# Patient Record
Sex: FEMALE | Race: OTHER | NOT HISPANIC OR LATINO
[De-identification: names, ages, dates, MRNs, and addresses within clinical notes are randomized per-mention and may not be internally consistent; named-entity substitution may affect disease eponyms.]

---

## 2021-09-24 ENCOUNTER — TRANSCRIPTION ENCOUNTER (OUTPATIENT)
Age: 21
End: 2021-09-24

## 2022-02-12 ENCOUNTER — TRANSCRIPTION ENCOUNTER (OUTPATIENT)
Age: 22
End: 2022-02-12

## 2023-06-06 PROBLEM — Z00.00 ENCOUNTER FOR PREVENTIVE HEALTH EXAMINATION: Status: ACTIVE | Noted: 2023-06-06

## 2023-06-07 ENCOUNTER — APPOINTMENT (OUTPATIENT)
Dept: NEUROLOGY | Facility: CLINIC | Age: 23
End: 2023-06-07
Payer: MEDICAID

## 2023-06-07 ENCOUNTER — TRANSCRIPTION ENCOUNTER (OUTPATIENT)
Age: 23
End: 2023-06-07

## 2023-06-07 VITALS — HEART RATE: 66 BPM | DIASTOLIC BLOOD PRESSURE: 99 MMHG | SYSTOLIC BLOOD PRESSURE: 117 MMHG

## 2023-06-07 DIAGNOSIS — Z86.59 PERSONAL HISTORY OF OTHER MENTAL AND BEHAVIORAL DISORDERS: ICD-10-CM

## 2023-06-07 DIAGNOSIS — Z78.9 OTHER SPECIFIED HEALTH STATUS: ICD-10-CM

## 2023-06-07 DIAGNOSIS — Z82.49 FAMILY HISTORY OF ISCHEMIC HEART DISEASE AND OTHER DISEASES OF THE CIRCULATORY SYSTEM: ICD-10-CM

## 2023-06-07 DIAGNOSIS — Z84.89 FAMILY HISTORY OF OTHER SPECIFIED CONDITIONS: ICD-10-CM

## 2023-06-07 DIAGNOSIS — Z83.3 FAMILY HISTORY OF DIABETES MELLITUS: ICD-10-CM

## 2023-06-07 DIAGNOSIS — R51.9 HEADACHE, UNSPECIFIED: ICD-10-CM

## 2023-06-07 PROCEDURE — 99204 OFFICE O/P NEW MOD 45 MIN: CPT

## 2023-06-07 RX ORDER — NORETHINDRONE ACETATE AND ETHINYL ESTRADIOL AND FERROUS FUMARATE 1.5-30(21)
KIT ORAL
Refills: 0 | Status: ACTIVE | COMMUNITY

## 2023-06-07 NOTE — ASSESSMENT
[FreeTextEntry1] : Impression is that of no localizing neurological symptoms. Because of the combination of sharp head pains and fuzzy comprehension, I ordered MRI of the brain to exclude any intracranial pathology. We will see patient back in follow up when imaging is complete.\par \par Total clinician time spent today on the patient is 30 minutes including preparing to see the patient, obtaining and/or reviewing and confirming history, performing medically necessary and appropriate examination, counseling and educating the patient and/or family, documenting clinical information in the EHR and communicating and/or referring to other healthcare professionals.\par \par Entered by Cecy Shea acting as scribe for Dr. Hernandez.\par \par \par The documentation recorded by the scribe, in my presence, accurately reflects the service I personally performed, and the decisions made by me with my edits as appropriate. \par Prince Hernandez MD, FAAN, FACP\par Diplomate American Board of Psychiatry & Neurology\par \par

## 2023-06-07 NOTE — HISTORY OF PRESENT ILLNESS
[FreeTextEntry1] : Ms. Holcomb is a 22-year-old woman who presents today in neurologic consultation accompanied by her mother for symptoms that began about 1 and 1/2 months ago of static electric shocks in various locations in her head and body. Patient also notes fuzziness of cognitive comprehension with difficulty understanding someone's name or number on the phone. This lasted about a week and then disappeared. 1 year ago, patient had occipital headaches which would last a week and then disappear. She currently has no symptoms for about 3 weeks.

## 2023-06-21 ENCOUNTER — APPOINTMENT (OUTPATIENT)
Dept: MRI IMAGING | Facility: CLINIC | Age: 23
End: 2023-06-21
Payer: MEDICAID

## 2023-06-21 PROCEDURE — 70551 MRI BRAIN STEM W/O DYE: CPT

## 2023-06-27 ENCOUNTER — APPOINTMENT (OUTPATIENT)
Dept: NEUROLOGY | Facility: CLINIC | Age: 23
End: 2023-06-27
Payer: MEDICAID

## 2023-06-27 VITALS — DIASTOLIC BLOOD PRESSURE: 83 MMHG | HEART RATE: 58 BPM | SYSTOLIC BLOOD PRESSURE: 110 MMHG

## 2023-06-27 PROCEDURE — 99214 OFFICE O/P EST MOD 30 MIN: CPT

## 2023-06-27 NOTE — ASSESSMENT
[FreeTextEntry1] : 22 year old female with intermittent head pain now resolved. Today we reviewed her MRI of the brain, negative for acute pathology reflective of her symptoms. She will follow up PRN. \par \par I have personally reviewed with the PA, this patient’s history and physical exam findings, as documented above. I have discussed the relevant areas of concern, having direct implications to the presenting problems and illnesses, and have personally examined all pertinent findings which impact on the prior neurological treatment. \par Demetria Pérez MS, PA-C\par Prince Hernandez MD \par  \par \par \par

## 2023-06-27 NOTE — PHYSICAL EXAM
[General Appearance - Alert] : alert [General Appearance - In No Acute Distress] : in no acute distress [Impaired Insight] : insight and judgment were intact [Oriented To Time, Place, And Person] : oriented to person, place, and time [Affect] : the affect was normal [Person] : oriented to person [Place] : oriented to place [Time] : oriented to time [Concentration Intact] : normal concentrating ability [Visual Intact] : visual attention was ~T not ~L decreased [Naming Objects] : no difficulty naming common objects [Repeating Phrases] : no difficulty repeating a phrase [Writing A Sentence] : no difficulty writing a sentence [Fluency] : fluency intact [Comprehension] : comprehension intact [Reading] : reading intact [Past History] : adequate knowledge of personal past history [Cranial Nerves Optic (II)] : visual acuity intact bilaterally,  visual fields full to confrontation, pupils equal round and reactive to light [Cranial Nerves Oculomotor (III)] : extraocular motion intact [Cranial Nerves Trigeminal (V)] : facial sensation intact symmetrically [Cranial Nerves Facial (VII)] : face symmetrical [Cranial Nerves Vestibulocochlear (VIII)] : hearing was intact bilaterally [Cranial Nerves Glossopharyngeal (IX)] : tongue and palate midline [Cranial Nerves Accessory (XI - Cranial And Spinal)] : head turning and shoulder shrug symmetric [Cranial Nerves Hypoglossal (XII)] : there was no tongue deviation with protrusion [Motor Strength] : muscle strength was normal in all four extremities [No Muscle Atrophy] : normal bulk in all four extremities [Sensation Tactile Decrease] : light touch was intact [Balance] : balance was intact [2+] : Ankle jerk left 2+ [PERRL With Normal Accommodation] : pupils were equal in size, round, reactive to light, with normal accommodation [Extraocular Movements] : extraocular movements were intact [Hearing Threshold Finger Rub Not Wilkinson] : hearing was normal [No Spinal Tenderness] : no spinal tenderness [Abnormal Walk] : normal gait [Involuntary Movements] : no involuntary movements were seen [Motor Tone] : muscle strength and tone were normal [FreeTextEntry1] : PHYSICAL EXAMINATION:\par Head: Normocephalic, atraumatic. Negative TA tenderness/prominence. \par \par Neck: Supple with full range of motion; nontender with negative bilateral Spurling's signs. \par \par Spine: Full range of motion; nontender. Negative straight leg raise maneuvers. \par \par Extremities: Non-tender. Atraumatic. Negative Tinel's signs. \par \par NEUROLOGICAL EXAMINATION: \par \par Mental Status: Patient is a good informant with intact orientation, attention, concentration, recent and remote memory. Language evaluation reveals no evidence of aphasia. Fund of knowledge is normal. \par \par Cranial Nerves Cranial Nerves: \par \par II, III, IV, VI: Pupils are equal, round, and reactive to light and accommodation. No evidence of afferent pupillary defect. Visual fields are full to confrontation. Eye movements are full without evidence of nystagmus or internuclear ophthalmoplegia. Funduscopic examination reveals sharp disc margins. \par \par V: Normal jaw movements. Normal facial sensation. \par \par VII: Normal facial motor testing. \par \par VIII: Grossly normal hearing bilaterally. \par \par IX, X: Palate moves symmetrically. No dysarthria. \par \par XI: Normal shoulder shrug and sternocleidomastoid power. \par \par XII: Tongue appears normal and protrudes in the midline. \par \par Motor: Normal bulk, tone, and power throughout. \par \par Muscle Stretch Reflexes (right/left): 2+ symmetrical. \par \par Plantar Responses: Flexor bilaterally. \par \par Coordination: Normal finger to nose and heel to shin testing, no truncal ataxia and no tremor. \par \par Sensation: Normal primary sensation. Normal double simultaneous stimulation. \par \par Gait and Station: Normal base, stride, and turning. Normal toe and heel walking. Normal tandem. Negative Romberg.\par  [Motor Strength Upper Extremities Bilaterally] : strength was normal in both upper extremities [Motor Strength Lower Extremities Bilaterally] : strength was normal in both lower extremities [Past-pointing] : there was no past-pointing [Tremor] : no tremor present [Plantar Reflex Right Only] : normal on the right [Plantar Reflex Left Only] : normal on the left

## 2023-06-27 NOTE — REASON FOR VISIT
[Follow-Up: _____] : a [unfilled] follow-up visit [Initial Evaluation] : an initial evaluation [FreeTextEntry1] : headaches

## 2023-06-27 NOTE — HISTORY OF PRESENT ILLNESS
[FreeTextEntry1] : Ms. Holcomb is a 22-year-old woman who presents today in neurologic consultation accompanied by her mother for symptoms that began about 1 and 1/2 months ago of static electric shocks in various locations in her head and body. Patient also notes fuzziness of cognitive comprehension with difficulty understanding someone's name or number on the phone. This lasted about a week and then disappeared. 1 year ago, patient had occipital headaches which would last a week and then disappear. She currently has no symptoms for about 3 weeks.\par \par MRI Brain : 7 mm right cerebellar tonsillar ectopia protruding through the foramen magnum, left cerebellar tonsillar ectopia. 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  The patients previous history and physical findings have been reviewed. \par \par Ms Holcomb is under our care for evaluation of intermittent sharp pains in her head, which have resolved and have not recurred in the last 3 months. Today we reviewed the results of her MRI of the brain. Her physical exam was overall benign without neurologic deficits. She denies sleep disturbances, vision changes, changes in mood, memory or cognition. After reviewing her MRI Ms Holcomb was relieved and states she otherwise feels well. \par  \par

## 2025-06-26 ENCOUNTER — EMERGENCY (EMERGENCY)
Facility: HOSPITAL | Age: 25
LOS: 0 days | Discharge: HOME | End: 2025-06-26
Attending: EMERGENCY MEDICINE
Payer: COMMERCIAL

## 2025-06-26 VITALS
HEART RATE: 83 BPM | DIASTOLIC BLOOD PRESSURE: 93 MMHG | RESPIRATION RATE: 18 BRPM | SYSTOLIC BLOOD PRESSURE: 135 MMHG | OXYGEN SATURATION: 100 %

## 2025-06-26 VITALS — WEIGHT: 164.91 LBS

## 2025-06-26 DIAGNOSIS — S51.011A LACERATION WITHOUT FOREIGN BODY OF RIGHT ELBOW, INITIAL ENCOUNTER: ICD-10-CM
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
## 2025-06-26 DIAGNOSIS — S60.811A ABRASION OF RIGHT WRIST, INITIAL ENCOUNTER: ICD-10-CM

## 2025-06-26 PROCEDURE — 90471 IMMUNIZATION ADMIN: CPT

## 2025-06-26 PROCEDURE — 99284 EMERGENCY DEPT VISIT MOD MDM: CPT

## 2025-06-26 PROCEDURE — 90715 TDAP VACCINE 7 YRS/> IM: CPT

## 2025-06-26 PROCEDURE — 99283 EMERGENCY DEPT VISIT LOW MDM: CPT | Mod: 25

## 2025-06-26 RX ORDER — LIDOCAINE HCL/EPINEPHRINE/PF 1 %-1:200K
10 AMPUL (ML) INJECTION ONCE
Refills: 0 | Status: COMPLETED | OUTPATIENT
Start: 2025-06-26 | End: 2025-06-26

## 2025-06-26 RX ORDER — IBUPROFEN 200 MG
600 TABLET ORAL ONCE
Refills: 0 | Status: COMPLETED | OUTPATIENT
Start: 2025-06-26 | End: 2025-06-26

## 2025-06-26 RX ADMIN — Medication 600 MILLIGRAM(S): at 04:14

## 2025-06-26 NOTE — ED PROVIDER NOTE - PHYSICAL EXAMINATION
Physical Exam    Vital Signs: I have reviewed the initial vital signs.  Constitutional: well-nourished, appears stated age, no acute distress  Eyes: Conjunctiva pink, Sclera clear, PERRLA, EOMI.  Cardiovascular: S1 and S2, regular rate, regular rhythm, well-perfused extremities, radial pulses equal and 2+  Respiratory: unlabored respiratory effort, clear to auscultation bilaterally no wheezing, rales and rhonchi  Gastrointestinal: soft, non-tender abdomen, no pulsatile mass, normal bowl sounds  Musculoskeletal: supple neck, no lower extremity edema, no midline tenderness  Integumentary: warm, dry, multiple abrasions to hand wrist and elbow. + 1 small superficial laceration 0.5mm  Neurologic: awake, alert, cranial nerves II-XII grossly intact, extremities’ motor and sensory functions grossly intact  Psychiatric: appropriate mood, appropriate affect

## 2025-06-26 NOTE — ED PROVIDER NOTE - NSFOLLOWUPINSTRUCTIONS_ED_ALL_ED_FT
Follow up with your primary care doctor in 1-2 days     Abrasion  ImageAn abrasion is a cut or a scrape on the outer surface of the skin. An abrasion does not go through all the layers of the skin. It is important to care for your abrasion properly to prevent infection.    What are the causes?  This condition is caused by falling on or gliding across the ground or another surface. When your skin rubs on something, the outer and inner layers of skin may rub off.    What are the signs or symptoms?  The main symptom of this condition is a cut or a scrape. The scrape may be bleeding, or it may appear red or pink. If the abrasion was caused by a fall, there may be a bruise under the cut or scrape.    How is this diagnosed?  An abrasion is diagnosed with a physical exam.    How is this treated?  Treatment for this condition depends on how large and deep the abrasion is. In most cases:    Your abrasion will be cleaned with water and mild soap. This is done to remove any dirt or debris (such as particles of glass or rock) that may be stuck in the wound.  An antibiotic ointment may be applied to the abrasion to help prevent infection.  A bandage (dressing) may be placed on the abrasion to keep it clean.    You may also need a tetanus shot.    Follow these instructions at home:  Medicines     Take or apply over-the-counter and prescription medicines only as told by your health care provider.  If you were prescribed an antibiotic medicine, apply it as told by your health care provider.  Wound care     Clean the wound 2–3 times a day, or as directed by your health care provider. To do this, wash the wound with mild soap and water, rinse off the soap, and pat the wound dry with a clean towel. Do not rub the wound.  Keep the dressing clean and dry as told by your health care provider.  There are many different ways to close and cover a wound. Follow instructions from your health care provider about:    Caring for your wound.  Changing and removing your dressing. You may have to change your dressing one or more times a day, or as directed by your health care provider.    Check your wound every day for signs of infection. Check for:    Redness, particularly a red streak that spreads out from the wound.  Swelling or increased pain.  Warmth.  Fluid, pus, or a bad smell.    If directed, put ice on the injured area to reduce pain and swelling:    Put ice in a plastic bag.   Place a towel between your skin and the bag.   Leave the ice on for 20 minutes, 2–3 times a day.    General instructions     Do not take baths, swim, or use a hot tub until your health care provider says it is okay to do so.  If possible, raise (elevate) the injured area above the level of your heart while you are sitting or lying down. This will reduce pain and swelling.  Keep all follow-up visits as directed by your health care provider. This is important.  Contact a health care provider if:  You received a tetanus shot, and you have swelling, severe pain, redness, or bleeding at the injection site.  Your pain is not controlled with medicine.  You have redness, swelling, or more pain at the site of your wound.  Get help right away if:  You have a red streak spreading away from your wound.  You have a fever.  You have fluid, blood, or pus coming from your wound.  You notice a bad smell coming from your wound or your dressing.  Summary  An abrasion is a cut or a scrape on the outer surface of the skin. An abrasion does not go through all the layers of the skin.  Care for your abrasion properly to prevent infection.  Clean the wound with mild soap and water 2–3 times a day. Follow instructions from your health care provider about taking medicines and changing your bandage (dressing).  Contact your health care provider if you have redness, swelling or more pain in the wound area.  Get help right away if you have a fever or if you have fluid, blood, pus, a bad smell, or a red streak coming from the wound.  This information is not intended to replace advice given to you by your health care provider. Make sure you discuss any questions you have with your health care provider.        Contusion in Adults    WHAT YOU NEED TO KNOW:    A contusion is a bruise that appears on your skin after an injury. A bruise happens when small blood vessels tear but skin does not. When blood vessels tear, blood leaks into nearby tissue, such as soft tissue or muscle.    DISCHARGE INSTRUCTIONS:    Return to the emergency department if:     You have new trouble moving the injured area.      You have tingling or numbness in or near the injured area.      Your hand or foot below the bruise gets cold or turns pale.     Contact your healthcare provider if:     You find a new lump in the injured area.      Your symptoms do not improve with treatment after 4 to 5 days.      You have questions or concerns about your condition or care.    Medicines: You may need any of the following:     NSAIDs help decrease swelling and pain or fever. This medicine is available with or without a doctor's order. NSAIDs can cause stomach bleeding or kidney problems in certain people. If you take blood thinner medicine, always ask your healthcare provider if NSAIDs are safe for you. Always read the medicine label and follow directions.      Prescription pain medicine may be given. Do not wait until the pain is severe before you take your medicine.      Take your medicine as directed. Contact your healthcare provider if you think your medicine is not helping or if you have side effects. Tell him of her if you are allergic to any medicine. Keep a list of the medicines, vitamins, and herbs you take. Include the amounts, and when and why you take them. Bring the list or the pill bottles to follow-up visits. Carry your medicine list with you in case of an emergency.    Follow up with your healthcare provider as directed: You may need to return within a week to check your injury again. Write down your questions so you remember to ask them during your visits.    Help a contusion heal:     Rest the injured area or use it less than usual. If you bruised your leg or foot, you may need crutches or a cane to help you walk. This will help you keep weight off your injured body part.       Apply ice to decrease swelling and pain. Ice may also help prevent tissue damage. Use an ice pack, or put crushed ice in a plastic bag. Cover it with a towel and place it on your bruise for 15 to 20 minutes every hour or as directed.      Use compression to support the area and decrease swelling. Wrap an elastic bandage around the area over the bruised muscle. Make sure the bandage is not too tight. You should be able to fit 1 finger between the bandage and your skin.      Elevate (raise) your injured body part above the level of your heart to help decrease pain and swelling. Use pillows, blankets, or rolled towels to elevate the area as often as you can.      Do not drink alcohol as directed. Alcohol may slow healing.      Do not stretch injured muscles right after your injury. Ask your healthcare provider when and how you may safely stretch after your injury. Gentle stretches can help increase your flexibility.      Do not massage the area or put heating pads on the bruise right after your injury. Heat and massage may slow healing. Your healthcare provider may tell you to apply heat after several days. At that time, heat will start to help the injury heal.    Prevent another contusion:     Stretch and warm up before you play sports or exercise.      Wear protective gear when you play sports. Examples are shin guards and padding.       If you begin a new physical activity, start slowly to give your body a chance to adjust.         © Copyright [employer] 2019 All illustrations and images included in CareNotes are the copyrighted property of [employer] or [employer].

## 2025-06-26 NOTE — ED PROVIDER NOTE - PATIENT PORTAL LINK FT
You can access the FollowMyHealth Patient Portal offered by [facility] by registering at the following website: [email or web address]. By joining [employer]’s FollowMyHealth portal, you will also be able to view your health information using other applications (apps) compatible with our system.

## 2025-06-26 NOTE — ED ADULT NURSE NOTE - IN ACCORDANCE WITH [place] LAW, WE OFFER EVERY PATIENT A HEPATITIS C TEST. WOULD YOU LIKE TO BE TESTED TODAY?
"lisinopril (PRINIVIL/ZESTRIL) 10 MG tablet 90 tablet 3 12/1/2017     Last Written Prescription Date:  12/1/17  Last Fill Quantity: 90,  # refills: 3   Last office visit: 4/5/2018 with prescribing provider:  Fiorella   Future Office Visit:  none  Requested Prescriptions   Pending Prescriptions Disp Refills     lisinopril (PRINIVIL/ZESTRIL) 10 MG tablet [Pharmacy Med Name: LISINOPRIL 10MG TABLETS] 90 tablet 0     Sig: TAKE 1 TABLET(10 MG) BY MOUTH DAILY    ACE Inhibitors (Including Combos) Protocol Failed    11/6/2018  3:36 AM       Failed - Blood pressure under 140/90 in past 12 months    BP Readings from Last 3 Encounters:   04/05/18 157/85   12/01/17 136/82   01/23/17 (!) 151/96                Passed - Recent (12 mo) or future (30 days) visit within the authorizing provider's specialty    Patient had office visit in the last 12 months or has a visit in the next 30 days with authorizing provider or within the authorizing provider's specialty.  See \"Patient Info\" tab in inbasket, or \"Choose Columns\" in Meds & Orders section of the refill encounter.             Passed - Patient is age 18 or older       Passed - No active pregnancy on record       Passed - Normal serum creatinine on file in past 12 months    Recent Labs   Lab Test  12/01/17   1258   CR  0.77            Passed - Normal serum potassium on file in past 12 months    Recent Labs   Lab Test  12/01/17   1258   POTASSIUM  4.3            Passed - No positive pregnancy test in past 12 months        No flowsheet data found.  " Opt out